# Patient Record
Sex: FEMALE | Race: WHITE | Employment: FULL TIME | URBAN - METROPOLITAN AREA
[De-identification: names, ages, dates, MRNs, and addresses within clinical notes are randomized per-mention and may not be internally consistent; named-entity substitution may affect disease eponyms.]

---

## 2020-01-13 ENCOUNTER — HOSPITAL ENCOUNTER (OUTPATIENT)
Dept: PREADMISSION TESTING | Age: 65
Discharge: HOME | End: 2020-01-17
Payer: COMMERCIAL

## 2020-01-13 ENCOUNTER — HOSPITAL ENCOUNTER (OUTPATIENT)
Dept: GENERAL RADIOLOGY | Age: 65
Discharge: HOME | End: 2020-01-15
Payer: COMMERCIAL

## 2020-01-13 VITALS
HEIGHT: 69 IN | OXYGEN SATURATION: 98 % | WEIGHT: 191.4 LBS | RESPIRATION RATE: 18 BRPM | DIASTOLIC BLOOD PRESSURE: 79 MMHG | HEART RATE: 78 BPM | SYSTOLIC BLOOD PRESSURE: 130 MMHG | BODY MASS INDEX: 28.35 KG/M2

## 2020-01-13 LAB
ANION GAP SERPL CALCULATED.3IONS-SCNC: 9 MMOL/L (ref 9–17)
BUN BLDV-MCNC: 14 MG/DL (ref 8–23)
BUN/CREAT BLD: 18 (ref 9–20)
CALCIUM SERPL-MCNC: 9.4 MG/DL (ref 8.6–10.4)
CHLORIDE BLD-SCNC: 103 MMOL/L (ref 98–107)
CO2: 27 MMOL/L (ref 20–31)
CREAT SERPL-MCNC: 0.8 MG/DL (ref 0.5–0.9)
GFR AFRICAN AMERICAN: >60 ML/MIN
GFR NON-AFRICAN AMERICAN: >60 ML/MIN
GFR SERPL CREATININE-BSD FRML MDRD: NORMAL ML/MIN/{1.73_M2}
GFR SERPL CREATININE-BSD FRML MDRD: NORMAL ML/MIN/{1.73_M2}
GLUCOSE BLD-MCNC: 72 MG/DL (ref 70–99)
HCT VFR BLD CALC: 41.9 % (ref 36.3–47.1)
HEMOGLOBIN: 13.2 G/DL (ref 11.9–15.1)
MCH RBC QN AUTO: 29.6 PG (ref 25.2–33.5)
MCHC RBC AUTO-ENTMCNC: 31.5 G/DL (ref 28.4–34.8)
MCV RBC AUTO: 93.9 FL (ref 82.6–102.9)
NRBC AUTOMATED: 0 PER 100 WBC
PDW BLD-RTO: 13.1 % (ref 11.8–14.4)
PLATELET # BLD: 263 K/UL (ref 138–453)
PMV BLD AUTO: 9.8 FL (ref 8.1–13.5)
POTASSIUM SERPL-SCNC: 4 MMOL/L (ref 3.7–5.3)
RBC # BLD: 4.46 M/UL (ref 3.95–5.11)
SODIUM BLD-SCNC: 139 MMOL/L (ref 135–144)
WBC # BLD: 8.5 K/UL (ref 3.5–11.3)

## 2020-01-13 PROCEDURE — 36415 COLL VENOUS BLD VENIPUNCTURE: CPT

## 2020-01-13 PROCEDURE — 80048 BASIC METABOLIC PNL TOTAL CA: CPT

## 2020-01-13 PROCEDURE — 93005 ELECTROCARDIOGRAM TRACING: CPT | Performed by: ANESTHESIOLOGY

## 2020-01-13 PROCEDURE — 71046 X-RAY EXAM CHEST 2 VIEWS: CPT

## 2020-01-13 PROCEDURE — 85027 COMPLETE CBC AUTOMATED: CPT

## 2020-01-13 RX ORDER — IBUPROFEN 400 MG/1
200 TABLET ORAL EVERY 6 HOURS PRN
COMMUNITY

## 2020-01-13 SDOH — HEALTH STABILITY: MENTAL HEALTH: HOW OFTEN DO YOU HAVE A DRINK CONTAINING ALCOHOL?: NEVER

## 2020-01-13 NOTE — H&P
Capacity:   1) Pt is able to walk 2 city blocks or more on level ground without SOB. 2) Pt is not able to climb 2 flights of stairs without SOB. 3) Pt is not able to walk up a hill for 1-2 city blocks without SOB. Family History:     Family History   Problem Relation Age of Onset    Cancer Father         Skin cancer    Diabetes Other     Breast Cancer Maternal Aunt     Diabetes Paternal Cousin     Heart Disease Neg Hx        Review of Systems:     Positive and Negative as described in HPI. CONSTITUTIONAL: Negative for fevers, chills, sweats, fatigue, and weight loss. HEENT: Pt wears reading glasses. Clear rhinorrhea. Negative for hearing changes and throat pain. RESPIRATORY: SOB with exertion. Negative for current shortness of breath, cough, congestion, and wheezing. Pt denies history of COPD and asthma. CARDIOVASCULAR: Negative for chest pain, blood clot, irregular heartbeat, and palpitations. GASTROINTESTINAL: Rare acid reflux. Negative for nausea, vomiting, diarrhea, constipation, change in bowel habits, and abdominal pain. GENITOURINARY: Negative for difficulty of urination, burning with urination, and frequency. INTEGUMENT: Negative for rash, skin lesions, and easy bruising. HEMATOLOGIC/LYMPHATIC: Negative for swelling/edema. ALLERGIC/IMMUNOLOGIC: Negative for urticaria and itching. ENDOCRINE: Heat intolerance. Pt gets blotches on her face when \"overheated\". Negative for increase in thirst, increase in urination, and cold intolerance. MUSCULOSKELETAL: Negative joint pains, muscle aches, and swelling of joints. NEUROLOGICAL: Negative for headaches, dizziness, lightheadedness, numbness, and tingling extremities. BEHAVIOR/PSYCH: Negative for depression and anxiety. Physical Exam:   /79   Pulse 78   Resp 18   Ht 5' 9\" (1.753 m)   Wt 191 lb 6.4 oz (86.8 kg)   SpO2 98%   BMI 28.26 kg/m²   No LMP recorded. Patient is postmenopausal.  No obstetric history on file.   No results for input(s): POCGLU in the last 72 hours. General Appearance:  Alert, well appearing, and in no acute distress. Mental status:  Oriented to person, place, and time. Head:  Normocephalic and atraumatic. Eye:  No icterus, redness, pupils equal and reactive, extraocular eye movements intact, and conjunctiva clear. Ear:  Hearing grossly intact. Nose:  No drainage noted. Mouth:  Mucous membranes moist.  Neck:  Supple and no carotid bruits noted. Lungs: Diminished throughout. Poor air exchange. No wheezing, rales, or rhonchi. Normal effort. Cardiovascular: Early systolic beats 1/49 seconds. Normal rate, regular rhythm, no murmur, gallop, or rub. Abdomen: Soft, non-tender, non-distended, and active bowel sounds. Neurologic: Normal speech and cranial nerves II through XII grossly intact. Strength 5/5 bilaterally. Skin: No gross lesions, rashes, bruising, or bleeding on exposed skin area. Extremities: Posterior tibial pulses 2+ bilaterally. No pedal edema. No calf tenderness with palpation. Psych: Normal affect. Investigations:      Laboratory Testing:  No results found for this or any previous visit (from the past 24 hour(s)). No results for input(s): HGB, HCT, WBC, MCV, PLATELET, NA, K, CL, CO2, BUN, CREATININE, GLUCOSE, INR, PROTIME, APTT, AST, ALT, LABALBU, HCG in the last 720 hours. EK2020. See Epic. Chest X-ray: Pending. Diagnosis:      1. Left breast cancer    Plans:     1.  Left breast lumpectomy with left sentinel node biopsy       RODRIGUEZ Shepherd - CNP  2020  2:07 PM

## 2020-01-14 LAB
EKG ATRIAL RATE: 70 BPM
EKG P AXIS: 74 DEGREES
EKG P-R INTERVAL: 162 MS
EKG Q-T INTERVAL: 386 MS
EKG QRS DURATION: 84 MS
EKG QTC CALCULATION (BAZETT): 416 MS
EKG R AXIS: 39 DEGREES
EKG T AXIS: 44 DEGREES
EKG VENTRICULAR RATE: 70 BPM

## 2020-01-14 PROCEDURE — 93010 ELECTROCARDIOGRAM REPORT: CPT | Performed by: INTERNAL MEDICINE

## 2020-01-27 ENCOUNTER — ANESTHESIA EVENT (OUTPATIENT)
Dept: OPERATING ROOM | Age: 65
End: 2020-01-27
Payer: COMMERCIAL

## 2020-01-28 ENCOUNTER — HOSPITAL ENCOUNTER (OUTPATIENT)
Dept: NUCLEAR MEDICINE | Age: 65
Discharge: HOME | End: 2020-01-30
Payer: COMMERCIAL

## 2020-01-28 ENCOUNTER — HOSPITAL ENCOUNTER (OUTPATIENT)
Age: 65
Setting detail: OUTPATIENT SURGERY
Discharge: HOME | End: 2020-01-28
Attending: SURGERY | Admitting: SURGERY
Payer: COMMERCIAL

## 2020-01-28 ENCOUNTER — ANESTHESIA (OUTPATIENT)
Dept: OPERATING ROOM | Age: 65
End: 2020-01-28
Payer: COMMERCIAL

## 2020-01-28 VITALS — DIASTOLIC BLOOD PRESSURE: 55 MMHG | OXYGEN SATURATION: 100 % | SYSTOLIC BLOOD PRESSURE: 95 MMHG | TEMPERATURE: 95.9 F

## 2020-01-28 VITALS
HEART RATE: 66 BPM | OXYGEN SATURATION: 98 % | SYSTOLIC BLOOD PRESSURE: 119 MMHG | RESPIRATION RATE: 16 BRPM | DIASTOLIC BLOOD PRESSURE: 82 MMHG | TEMPERATURE: 97.2 F

## 2020-01-28 PROCEDURE — 7100000011 HC PHASE II RECOVERY - ADDTL 15 MIN: Performed by: SURGERY

## 2020-01-28 PROCEDURE — 2720000010 HC SURG SUPPLY STERILE: Performed by: SURGERY

## 2020-01-28 PROCEDURE — 2580000003 HC RX 258: Performed by: SURGERY

## 2020-01-28 PROCEDURE — 2709999900 HC NON-CHARGEABLE SUPPLY: Performed by: SURGERY

## 2020-01-28 PROCEDURE — 2500000003 HC RX 250 WO HCPCS: Performed by: NURSE ANESTHETIST, CERTIFIED REGISTERED

## 2020-01-28 PROCEDURE — 6370000000 HC RX 637 (ALT 250 FOR IP): Performed by: SURGERY

## 2020-01-28 PROCEDURE — 7100000010 HC PHASE II RECOVERY - FIRST 15 MIN: Performed by: SURGERY

## 2020-01-28 PROCEDURE — 78195 LYMPH SYSTEM IMAGING: CPT

## 2020-01-28 PROCEDURE — 6360000002 HC RX W HCPCS: Performed by: SURGERY

## 2020-01-28 PROCEDURE — 6360000002 HC RX W HCPCS: Performed by: NURSE ANESTHETIST, CERTIFIED REGISTERED

## 2020-01-28 PROCEDURE — A9520 TC99 TILMANOCEPT DIAG 0.5MCI: HCPCS | Performed by: SURGERY

## 2020-01-28 PROCEDURE — 2500000003 HC RX 250 WO HCPCS: Performed by: SURGERY

## 2020-01-28 PROCEDURE — 3700000000 HC ANESTHESIA ATTENDED CARE: Performed by: SURGERY

## 2020-01-28 PROCEDURE — 7100000000 HC PACU RECOVERY - FIRST 15 MIN: Performed by: SURGERY

## 2020-01-28 PROCEDURE — 6370000000 HC RX 637 (ALT 250 FOR IP): Performed by: ANESTHESIOLOGY

## 2020-01-28 PROCEDURE — 3700000001 HC ADD 15 MINUTES (ANESTHESIA): Performed by: SURGERY

## 2020-01-28 PROCEDURE — 88307 TISSUE EXAM BY PATHOLOGIST: CPT

## 2020-01-28 PROCEDURE — 3600000012 HC SURGERY LEVEL 2 ADDTL 15MIN: Performed by: SURGERY

## 2020-01-28 PROCEDURE — 3430000000 HC RX DIAGNOSTIC RADIOPHARMACEUTICAL: Performed by: SURGERY

## 2020-01-28 PROCEDURE — 7100000001 HC PACU RECOVERY - ADDTL 15 MIN: Performed by: SURGERY

## 2020-01-28 PROCEDURE — 88342 IMHCHEM/IMCYTCHM 1ST ANTB: CPT

## 2020-01-28 PROCEDURE — 3600000002 HC SURGERY LEVEL 2 BASE: Performed by: SURGERY

## 2020-01-28 PROCEDURE — 2580000003 HC RX 258: Performed by: ANESTHESIOLOGY

## 2020-01-28 RX ORDER — LIDOCAINE HYDROCHLORIDE 20 MG/ML
INJECTION, SOLUTION EPIDURAL; INFILTRATION; INTRACAUDAL; PERINEURAL PRN
Status: DISCONTINUED | OUTPATIENT
Start: 2020-01-28 | End: 2020-01-28 | Stop reason: SDUPTHER

## 2020-01-28 RX ORDER — FENTANYL CITRATE 50 UG/ML
50 INJECTION, SOLUTION INTRAMUSCULAR; INTRAVENOUS EVERY 5 MIN PRN
Status: DISCONTINUED | OUTPATIENT
Start: 2020-01-28 | End: 2020-01-28 | Stop reason: HOSPADM

## 2020-01-28 RX ORDER — ONDANSETRON 2 MG/ML
INJECTION INTRAMUSCULAR; INTRAVENOUS PRN
Status: DISCONTINUED | OUTPATIENT
Start: 2020-01-28 | End: 2020-01-28 | Stop reason: SDUPTHER

## 2020-01-28 RX ORDER — HYDROMORPHONE HCL 110MG/55ML
0.5 PATIENT CONTROLLED ANALGESIA SYRINGE INTRAVENOUS EVERY 5 MIN PRN
Status: DISCONTINUED | OUTPATIENT
Start: 2020-01-28 | End: 2020-01-28 | Stop reason: HOSPADM

## 2020-01-28 RX ORDER — PROPOFOL 10 MG/ML
INJECTION, EMULSION INTRAVENOUS PRN
Status: DISCONTINUED | OUTPATIENT
Start: 2020-01-28 | End: 2020-01-28 | Stop reason: SDUPTHER

## 2020-01-28 RX ORDER — FENTANYL CITRATE 50 UG/ML
INJECTION, SOLUTION INTRAMUSCULAR; INTRAVENOUS PRN
Status: DISCONTINUED | OUTPATIENT
Start: 2020-01-28 | End: 2020-01-28 | Stop reason: SDUPTHER

## 2020-01-28 RX ORDER — OXYCODONE HYDROCHLORIDE AND ACETAMINOPHEN 5; 325 MG/1; MG/1
1 TABLET ORAL EVERY 4 HOURS PRN
Qty: 15 TABLET | Refills: 0 | Status: SHIPPED | OUTPATIENT
Start: 2020-01-28 | End: 2020-02-04

## 2020-01-28 RX ORDER — DEXAMETHASONE SODIUM PHOSPHATE 10 MG/ML
INJECTION INTRAMUSCULAR; INTRAVENOUS PRN
Status: DISCONTINUED | OUTPATIENT
Start: 2020-01-28 | End: 2020-01-28 | Stop reason: SDUPTHER

## 2020-01-28 RX ORDER — HYDROMORPHONE HCL 110MG/55ML
0.25 PATIENT CONTROLLED ANALGESIA SYRINGE INTRAVENOUS EVERY 5 MIN PRN
Status: DISCONTINUED | OUTPATIENT
Start: 2020-01-28 | End: 2020-01-28 | Stop reason: HOSPADM

## 2020-01-28 RX ORDER — LIDOCAINE HYDROCHLORIDE 10 MG/ML
1 INJECTION, SOLUTION EPIDURAL; INFILTRATION; INTRACAUDAL; PERINEURAL
Status: DISCONTINUED | OUTPATIENT
Start: 2020-01-29 | End: 2020-01-28 | Stop reason: HOSPADM

## 2020-01-28 RX ORDER — SODIUM CHLORIDE, SODIUM LACTATE, POTASSIUM CHLORIDE, CALCIUM CHLORIDE 600; 310; 30; 20 MG/100ML; MG/100ML; MG/100ML; MG/100ML
INJECTION, SOLUTION INTRAVENOUS CONTINUOUS
Status: DISCONTINUED | OUTPATIENT
Start: 2020-01-29 | End: 2020-01-28 | Stop reason: HOSPADM

## 2020-01-28 RX ORDER — IBUPROFEN 800 MG/1
800 TABLET ORAL EVERY 8 HOURS PRN
Qty: 50 TABLET | Refills: 3 | Status: SHIPPED | OUTPATIENT
Start: 2020-01-28

## 2020-01-28 RX ORDER — SODIUM CHLORIDE 9 MG/ML
INJECTION INTRAVENOUS PRN
Status: DISCONTINUED | OUTPATIENT
Start: 2020-01-28 | End: 2020-01-28 | Stop reason: ALTCHOICE

## 2020-01-28 RX ORDER — BUPIVACAINE HYDROCHLORIDE 2.5 MG/ML
INJECTION, SOLUTION EPIDURAL; INFILTRATION; INTRACAUDAL PRN
Status: DISCONTINUED | OUTPATIENT
Start: 2020-01-28 | End: 2020-01-28 | Stop reason: ALTCHOICE

## 2020-01-28 RX ORDER — ONDANSETRON 2 MG/ML
4 INJECTION INTRAMUSCULAR; INTRAVENOUS
Status: DISCONTINUED | OUTPATIENT
Start: 2020-01-28 | End: 2020-01-28 | Stop reason: HOSPADM

## 2020-01-28 RX ORDER — SCOLOPAMINE TRANSDERMAL SYSTEM 1 MG/1
1 PATCH, EXTENDED RELEASE TRANSDERMAL ONCE
Status: DISCONTINUED | OUTPATIENT
Start: 2020-01-28 | End: 2020-01-28 | Stop reason: HOSPADM

## 2020-01-28 RX ORDER — MIDAZOLAM HYDROCHLORIDE 1 MG/ML
INJECTION INTRAMUSCULAR; INTRAVENOUS PRN
Status: DISCONTINUED | OUTPATIENT
Start: 2020-01-28 | End: 2020-01-28 | Stop reason: SDUPTHER

## 2020-01-28 RX ORDER — FENTANYL CITRATE 50 UG/ML
25 INJECTION, SOLUTION INTRAMUSCULAR; INTRAVENOUS EVERY 5 MIN PRN
Status: DISCONTINUED | OUTPATIENT
Start: 2020-01-28 | End: 2020-01-28 | Stop reason: HOSPADM

## 2020-01-28 RX ORDER — LIDOCAINE 40 MG/G
CREAM TOPICAL
Status: COMPLETED | OUTPATIENT
Start: 2020-01-28 | End: 2020-01-28

## 2020-01-28 RX ORDER — LIDOCAINE 40 MG/G
CREAM TOPICAL
Status: CANCELLED | OUTPATIENT
Start: 2020-01-28

## 2020-01-28 RX ADMIN — MIDAZOLAM 2 MG: 1 INJECTION INTRAMUSCULAR; INTRAVENOUS at 12:13

## 2020-01-28 RX ADMIN — TILMANOCEPT 0.6 MILLICURIE: KIT at 10:17

## 2020-01-28 RX ADMIN — SODIUM CHLORIDE, POTASSIUM CHLORIDE, SODIUM LACTATE AND CALCIUM CHLORIDE: 600; 310; 30; 20 INJECTION, SOLUTION INTRAVENOUS at 09:30

## 2020-01-28 RX ADMIN — PROPOFOL 25 MG: 10 INJECTION, EMULSION INTRAVENOUS at 13:16

## 2020-01-28 RX ADMIN — Medication 100 MCG: at 12:44

## 2020-01-28 RX ADMIN — PROPOFOL 150 MG: 10 INJECTION, EMULSION INTRAVENOUS at 12:21

## 2020-01-28 RX ADMIN — Medication 100 MCG: at 12:20

## 2020-01-28 RX ADMIN — DEXAMETHASONE SODIUM PHOSPHATE 10 MG: 10 INJECTION INTRAMUSCULAR; INTRAVENOUS at 12:25

## 2020-01-28 RX ADMIN — LIDOCAINE 4%: 4 CREAM TOPICAL at 09:32

## 2020-01-28 RX ADMIN — LIDOCAINE HYDROCHLORIDE 100 MG: 20 INJECTION, SOLUTION EPIDURAL; INFILTRATION; INTRACAUDAL; PERINEURAL at 12:21

## 2020-01-28 RX ADMIN — PROPOFOL 25 MG: 10 INJECTION, EMULSION INTRAVENOUS at 13:50

## 2020-01-28 RX ADMIN — DEXTROSE MONOHYDRATE 2 G: 50 INJECTION, SOLUTION INTRAVENOUS at 12:28

## 2020-01-28 RX ADMIN — ONDANSETRON 4 MG: 2 INJECTION, SOLUTION INTRAMUSCULAR; INTRAVENOUS at 12:25

## 2020-01-28 ASSESSMENT — PULMONARY FUNCTION TESTS
PIF_VALUE: 12
PIF_VALUE: 15
PIF_VALUE: 12
PIF_VALUE: 15
PIF_VALUE: 12
PIF_VALUE: 15
PIF_VALUE: 15
PIF_VALUE: 12
PIF_VALUE: 15
PIF_VALUE: 2
PIF_VALUE: 12
PIF_VALUE: 15
PIF_VALUE: 15
PIF_VALUE: 6
PIF_VALUE: 12
PIF_VALUE: 15
PIF_VALUE: 12
PIF_VALUE: 15
PIF_VALUE: 12
PIF_VALUE: 14
PIF_VALUE: 12
PIF_VALUE: 6
PIF_VALUE: 13
PIF_VALUE: 12
PIF_VALUE: 6
PIF_VALUE: 1
PIF_VALUE: 14
PIF_VALUE: 12
PIF_VALUE: 12
PIF_VALUE: 15
PIF_VALUE: 12
PIF_VALUE: 14
PIF_VALUE: 0
PIF_VALUE: 16
PIF_VALUE: 15
PIF_VALUE: 12
PIF_VALUE: 12
PIF_VALUE: 2
PIF_VALUE: 12
PIF_VALUE: 6
PIF_VALUE: 12
PIF_VALUE: 14
PIF_VALUE: 15
PIF_VALUE: 12
PIF_VALUE: 12
PIF_VALUE: 2
PIF_VALUE: 12
PIF_VALUE: 15
PIF_VALUE: 1
PIF_VALUE: 1
PIF_VALUE: 12
PIF_VALUE: 21
PIF_VALUE: 12
PIF_VALUE: 12
PIF_VALUE: 14
PIF_VALUE: 12
PIF_VALUE: 14
PIF_VALUE: 15
PIF_VALUE: 11
PIF_VALUE: 12
PIF_VALUE: 15
PIF_VALUE: 12
PIF_VALUE: 16
PIF_VALUE: 12
PIF_VALUE: 15
PIF_VALUE: 12
PIF_VALUE: 15
PIF_VALUE: 12
PIF_VALUE: 1
PIF_VALUE: 12
PIF_VALUE: 0
PIF_VALUE: 12
PIF_VALUE: 6
PIF_VALUE: 12
PIF_VALUE: 12
PIF_VALUE: 15
PIF_VALUE: 14
PIF_VALUE: 12
PIF_VALUE: 5
PIF_VALUE: 15
PIF_VALUE: 12
PIF_VALUE: 0
PIF_VALUE: 14
PIF_VALUE: 12
PIF_VALUE: 14
PIF_VALUE: 15
PIF_VALUE: 12
PIF_VALUE: 15

## 2020-01-28 ASSESSMENT — PAIN DESCRIPTION - PAIN TYPE: TYPE: SURGICAL PAIN

## 2020-01-28 ASSESSMENT — PAIN DESCRIPTION - ORIENTATION: ORIENTATION: LEFT

## 2020-01-28 ASSESSMENT — PAIN SCALES - GENERAL
PAINLEVEL_OUTOF10: 0
PAINLEVEL_OUTOF10: 0
PAINLEVEL_OUTOF10: 2

## 2020-01-28 ASSESSMENT — PAIN DESCRIPTION - LOCATION: LOCATION: BREAST

## 2020-01-28 ASSESSMENT — PAIN DESCRIPTION - DESCRIPTORS: DESCRIPTORS: SORE

## 2020-01-28 NOTE — H&P
Historical Provider, MD            Allergies:      Patient has no known allergies. Social History:      Tobacco:    reports that she quit smoking about 5 years ago. She has never used smokeless tobacco.  Alcohol:      reports previous alcohol use. Drug Use:  reports current drug use. Functional Capacity:              1) Pt is able to walk 2 city blocks or more on level ground without SOB. 2) Pt is not able to climb 2 flights of stairs without SOB. 3) Pt is not able to walk up a hill for 1-2 city blocks without SOB. Family History:      Family History         Family History   Problem Relation Age of Onset    Cancer Father           Skin cancer    Diabetes Other      Breast Cancer Maternal Aunt      Diabetes Paternal Cousin      Heart Disease Neg Hx              Review of Systems:      Positive and Negative as described in HPI. CONSTITUTIONAL: Negative for fevers, chills, sweats, fatigue, and weight loss. HEENT: Pt wears reading glasses. Clear rhinorrhea. Negative for hearing changes and throat pain. RESPIRATORY: SOB with exertion. Negative for current shortness of breath, cough, congestion, and wheezing. Pt denies history of COPD and asthma. CARDIOVASCULAR: Negative for chest pain, blood clot, irregular heartbeat, and palpitations. GASTROINTESTINAL: Rare acid reflux. Negative for nausea, vomiting, diarrhea, constipation, change in bowel habits, and abdominal pain. GENITOURINARY: Negative for difficulty of urination, burning with urination, and frequency. INTEGUMENT: Negative for rash, skin lesions, and easy bruising. HEMATOLOGIC/LYMPHATIC: Negative for swelling/edema. ALLERGIC/IMMUNOLOGIC: Negative for urticaria and itching. ENDOCRINE: Heat intolerance. Pt gets blotches on her face when \"overheated\". Negative for increase in thirst, increase in urination, and cold intolerance.   MUSCULOSKELETAL: Negative joint pains, muscle aches, and swelling of joints. NEUROLOGICAL: Negative for headaches, dizziness, lightheadedness, numbness, and tingling extremities. BEHAVIOR/PSYCH: Negative for depression and anxiety. Physical Exam:   /79   Pulse 78   Resp 18   Ht 5' 9\" (1.753 m)   Wt 191 lb 6.4 oz (86.8 kg)   SpO2 98%   BMI 28.26 kg/m²   No LMP recorded. Patient is postmenopausal.  No obstetric history on file. No results for input(s): POCGLU in the last 72 hours. General Appearance:  Alert, well appearing, and in no acute distress. Mental status:  Oriented to person, place, and time. Head:  Normocephalic and atraumatic. Eye:  No icterus, redness, pupils equal and reactive, extraocular eye movements intact, and conjunctiva clear. Ear:  Hearing grossly intact. Nose:  No drainage noted. Mouth:  Mucous membranes moist.  Neck:  Supple and no carotid bruits noted. Lungs: Diminished throughout. Poor air exchange. No wheezing, rales, or rhonchi. Normal effort. Cardiovascular: Early systolic beats 5/44 seconds. Normal rate, regular rhythm, no murmur, gallop, or rub. Abdomen: Soft, non-tender, non-distended, and active bowel sounds. Neurologic: Normal speech and cranial nerves II through XII grossly intact. Strength 5/5 bilaterally. Skin: No gross lesions, rashes, bruising, or bleeding on exposed skin area. Extremities: Posterior tibial pulses 2+ bilaterally. No pedal edema. No calf tenderness with palpation. Psych: Normal affect. Investigations:       Laboratory Testing:  Recent Results   No results found for this or any previous visit (from the past 24 hour(s)). No results for input(s): HGB, HCT, WBC, MCV, PLATELET, NA, K, CL, CO2, BUN, CREATININE, GLUCOSE, INR, PROTIME, APTT, AST, ALT, LABALBU, HCG in the last 720 hours. EK2020. See Epic. Chest X-ray: Pending. Diagnosis:       1. Left breast cancer     Plans:      1.  Left breast lumpectomy with left sentinel node biopsy         RODRIGUEZ Mejia - CNP  1/13/2020  2:07 PM               Cosigned by: Rhett Welch MD at 1/14/2020  1:17 PM   Revision History                       Routing History

## 2020-01-28 NOTE — OP NOTE
Jessica Solizens  [id number]  Preoperative diagnosis: Left breast cancer    Postoperative diagnosis: Left breast cancer    Procedure: Left breast lumpectomy. Left sentinel node biopsies x2    Anesthesia: General    Surgeon: Dr. Rosette Lance    Indications: This 80-year-old patient is admitted today for treatment of a recently diagnosed left breast cancer which was invasive ductal type estrogen and progesterone receptor positive. After discussing options for treatment, the patient wished to proceed with left breast conserving surgery. The patient did realize that the malignancy may involve the skin and the muscle and would involve full-thickness removal of the breast tissue. The patient is admitted today for this procedure. Procedure: The patient was previously injected with technetium sulfur colloid in preparation for the sentinel node biopsies. She was brought to the surgical suite and given a general anesthesia. Ancef 2 g was given intravenously prior to the incision. Pneumatic cuffs were placed on both legs and continued throughout the case. A warming blanket was placed over the lower torso. The entire left breast, left axilla, shoulder neck and abdomen were prepped with Hibiclens soap and draped in a sterile fashion. The cancer was located in the upper inner quadrant about 4 cm from the nipple areolar complex. Due to the fact that the lesion was going to come very close to the skin, I did make an incision transversely overlying the mass. This was carried through the skin into the subcutaneous tissues with the peak plasma blade creating an upper and lower skin flap. The mass associated with some normal surrounding breast tissue was excised from the subcutaneous tissues down to the pectoralis major fascia. This was then carefully oriented for pathology and sent to pathology for frozen section for margins.   The pathologist called back stating that the anterior superior, superior margin and superior posterior margin were all focally positive. The other margins were at least 5 mm. I did remove additional tissue from the anterior superior margin including skin, the superior margin, and the posterior margin which involved pectoralis muscle. Hemostasis was obtained. The cavity was irrigated with sterile water until the effluent returned clear and packed with water soaked gauze. I redirected my attention to the left axilla where a transverse incision was made with the peak plasma blade in the lower axillary crease. This was carried through the skin and the subcutaneous tissues. I used the harmonic focus to dissected through the subcutaneous tissues and axillary fascia. A probe was brought into the wound and identified the first hotspot in the mid central portion. There was a small lymph node dissected free which had counts of 10,000. There was a second lymph node adjacent to this which had counts of 2500. These 2 lymph nodes were dissected and placed in a sterile container. I rescanned the entire axilla and did not find any elevated counts over 250. Since 10% of the hottest lymph node was greater than 1000, no further axillary lymph nodes were removed. Hemostasis was obtained. 1/4% plain Marcaine was infiltrated along each incision and the deeper aspects of each wound for a total of 70 cc. Each cavity was irrigated with sterile water until the effluent returned clear. The left breast incision was closed by removing any dogears medially and laterally. The dermis was reapproximated with sutures of 3-0 Monocryl followed by the skin with a running subcuticular stitch of 4-0 Monocryl. The axillary incision was closed similarly with interrupted sutures of 3-0 Monocryl in the dermis and a running subcuticular stitch of 4-0 Monocryl in the skin. Dermabond was applied followed by sterile dressing and an Ace wrap. At this point the patient was returned to the recovery room in good condition.   Sponge and needle counts are correct. Estimated blood loss is less than 50 cc.

## 2020-01-28 NOTE — ANESTHESIA PRE PROCEDURE
Department of Anesthesiology  Preprocedure Note       Name:  Destiny Bowman   Age:  60 y.o.  :  1955                                          MRN:  [id number]         Date:  2020      Surgeon: Chava Malone):  Jermaine Holloway MD    Procedure: LEFT BREAST LUMPECTOMY  WITH LEFT SENTINEL NODE BX  @ 10AM (Left )    Medications prior to admission:   Prior to Admission medications    Medication Sig Start Date End Date Taking? Authorizing Provider   ibuprofen (ADVIL;MOTRIN) 400 MG tablet Take 400 mg by mouth every 6 hours as needed for Pain    Historical Provider, MD   Probiotic Product (CULTURELLE PROBIOTICS PO) Take by mouth    Historical Provider, MD       Current medications:    Current Facility-Administered Medications   Medication Dose Route Frequency Provider Last Rate Last Dose    [START ON 2020] lactated ringers infusion   Intravenous Continuous Jose Kessler MD       Ofelia Bio [START ON 2020] lidocaine PF 1 % injection 1 mL  1 mL Intradermal Once PRN Jose Kessler MD        lidocaine (LMX) 4 % cream   Topical As Directed RT PRN Jermaine Holloway MD           Allergies:  No Known Allergies    Problem List:  There is no problem list on file for this patient. Past Medical History:        Diagnosis Date    Cancer ([address]     breast    Heat intolerance     Pt gets blotches on her face when overheated.     PONV (postoperative nausea and vomiting)        Past Surgical History:        Procedure Laterality Date    CYST REMOVAL      1978       Social History:    Social History     Tobacco Use    Smoking status: Former Smoker     Last attempt to quit:      Years since quittin.0    Smokeless tobacco: Never Used   Substance Use Topics    Alcohol use: Not Currently     Frequency: Never                                Counseling given: Not Answered      Vital Signs (Current):   Vitals:    20 0918   BP: 120/70   Pulse: 79   Resp: 16   Temp: 97.9 °F (36.6 °C)   SpO2: 98% BP Readings from Last 3 Encounters:   01/28/20 120/70   01/13/20 130/79       NPO Status:                                                                                 BMI:   Wt Readings from Last 3 Encounters:   01/13/20 191 lb 6.4 oz (86.8 kg)     There is no height or weight on file to calculate BMI.    CBC:   Lab Results   Component Value Date    WBC 8.5 01/13/2020    RBC 4.46 01/13/2020    HGB 13.2 01/13/2020    HCT 41.9 01/13/2020    MCV 93.9 01/13/2020    RDW 13.1 01/13/2020     01/13/2020       CMP:   Lab Results   Component Value Date     01/13/2020    K 4.0 01/13/2020     01/13/2020    CO2 27 01/13/2020    BUN 14 01/13/2020    CREATININE 0.80 01/13/2020    GFRAA >60 01/13/2020    LABGLOM >60 01/13/2020    GLUCOSE 72 01/13/2020    CALCIUM 9.4 01/13/2020       POC Tests: No results for input(s): POCGLU, POCNA, POCK, POCCL, POCBUN, POCHEMO, POCHCT in the last 72 hours. Coags: No results found for: PROTIME, INR, APTT    HCG (If Applicable): No results found for: PREGTESTUR, PREGSERUM, HCG, HCGQUANT     ABGs: No results found for: PHART, PO2ART, HQA3YFC, QXA9XWJ, BEART, H2QVJDOM     Type & Screen (If Applicable):  No results found for: LABABO, LABRH    Anesthesia Evaluation     history of anesthetic complications: PONV. Airway: Mallampati: I  TM distance: >3 FB   Neck ROM: full  Mouth opening: > = 3 FB Dental:          Pulmonary: breath sounds clear to auscultation                             Cardiovascular:  Exercise tolerance: good (>4 METS),       (-)  angina and  MCCLOUD      Rhythm: regular  Rate: normal                    Neuro/Psych:               GI/Hepatic/Renal:             Endo/Other:    (+) malignancy/cancer. Abdominal:           Vascular:                                        Anesthesia Plan      general     ASA 2       Induction: intravenous. Anesthetic plan and risks discussed with patient.                       Sebas Basilio MD

## 2020-01-28 NOTE — ANESTHESIA POSTPROCEDURE EVALUATION
Department of Anesthesiology  Postprocedure Note    Patient: Elas Chang  MRN: [id number]  YOB: 1955  Date of evaluation: 1/28/2020  Time:  3:37 PM     Procedure Summary     Date:  01/28/20 Room / Location:  [place] [address] / [facility] - INPATIENT    Anesthesia Start:  8095 Anesthesia Stop:  0996    Procedure:  LEFT BREAST LUMPECTOMY  WITH LEFT SENTINEL NODE BX  @ 10AM (Left ) Diagnosis:  (DX LEFT BREAST CA)    Surgeon:  Tyrell Baker MD Responsible Provider:  Irma Marinelli MD    Anesthesia Type:  general ASA Status:  2          Anesthesia Type: general    Isiah Phase I: Isiah Score: 10    Isiah Phase II:      Last vitals: Reviewed and per EMR flowsheets.        Anesthesia Post Evaluation    Patient location during evaluation: PACU  Level of consciousness: awake and alert  Complications: no

## 2020-01-31 LAB — SURGICAL PATHOLOGY REPORT: NORMAL

## 2020-02-03 ENCOUNTER — TELEPHONE (OUTPATIENT)
Dept: ONCOLOGY | Age: 65
End: 2020-02-03

## 2020-02-10 ENCOUNTER — TELEPHONE (OUTPATIENT)
Dept: RADIATION ONCOLOGY | Facility: MEDICAL CENTER | Age: 65
End: 2020-02-10

## 2020-02-11 ENCOUNTER — TELEPHONE (OUTPATIENT)
Dept: RADIATION ONCOLOGY | Facility: MEDICAL CENTER | Age: 65
End: 2020-02-11

## 2020-02-27 ENCOUNTER — TELEPHONE (OUTPATIENT)
Dept: ONCOLOGY | Age: 65
End: 2020-02-27

## 2020-02-27 ENCOUNTER — HOSPITAL ENCOUNTER (OUTPATIENT)
Dept: RADIATION ONCOLOGY | Facility: MEDICAL CENTER | Age: 65
Discharge: HOME | End: 2020-02-27
Payer: COMMERCIAL

## 2020-02-27 VITALS
DIASTOLIC BLOOD PRESSURE: 79 MMHG | BODY MASS INDEX: 28.66 KG/M2 | HEART RATE: 73 BPM | RESPIRATION RATE: 16 BRPM | SYSTOLIC BLOOD PRESSURE: 122 MMHG | HEIGHT: 69 IN | WEIGHT: 193.5 LBS | OXYGEN SATURATION: 98 % | TEMPERATURE: 97.9 F

## 2020-02-27 PROCEDURE — 99205 OFFICE O/P NEW HI 60 MIN: CPT | Performed by: RADIOLOGY

## 2020-02-27 PROCEDURE — 99213 OFFICE O/P EST LOW 20 MIN: CPT | Performed by: RADIOLOGY

## 2020-02-27 NOTE — PROGRESS NOTES
attended at all times by family member or staff  3. Provide assistance as indicated for ambulation activities  4. Reorient confused/cognitively impaired patient  5. Call-light/bell within patient's reach  6. Chair/bed in low position, stretcher/bed with siderails up except when performing patient care activities  7. Educate patient/family/caregiver on falls prevention             Assessment/Plan: Patient was seen today for consultation.          Funmi Fajardo 2/27/2020 2:10 PM

## 2020-02-27 NOTE — PROGRESS NOTES
normal dentition, uvula and soft palate elevate symmetrically on phonation. No suspicious asymmetry or mucosal lesions appreciated. NECK: No thyromegaly, cervical or supraclavicular lymphadenopathy. LUNGS: Clear to auscultation bilaterally, with good aeration. HEART: Normal rate, regular rhythm, normal S1/S2, no murmurs/rubs/gallops. BREAST: Status post left lumpectomy iin the upper inner quadrant, resolving ecchymosis, mild kelly-incisional induration, as expected, no tenderness to palpation. No palpable adenopathy in either axillae or supraclavicular fossae. No palpable mass in contralateral breast.  ABD: Normoactive bowel sounds, soft, nontender, no visceromegaly or masses appreciated. LYMPH: No lymphadenopathy appreciated. EXT: Full range of motion in all extremities. No cyanosis/clubbing or edema. NEURO: Alert and fully oriented, answers questions appropriately, speech is fluent without dysarthria, cranial nerves II through XII grossly normal, muscle power in all extremities 5/5. PSYCH: Affect is appropriate for clinical situation. Insight and judgment do not appear impaired. LABS: 1/13/2020 CBC within normal limits. Same-day basic metabolic panel also within normal limits. RADS: 11/25/2019 bilateral digital diagnostic mammogram and left ultrasound showed a 2.7 cm irregular high density mass with a spiculated margin in the left breast, 10 o'clock position, middle depth, correlates to the palpable lesion. Ultrasound demonstrated an irregular solid mass correlating to mammography findings. BI-RADS Category 5, highly suggestive of malignancy. PATHOLOGY: 1/28/2020 left breast lumpectomy specimen showed poorly differentiated invasive ductal carcinoma, 3.2 cm in greatest dimension, grade 3. Initial margin assessment revealed invasive carcinoma 0.1 mm from closest superior anterior margin, and 2 mm from central deep posterior margin.   Additional excision resulted in greater than 5 mm margins Ethan Hernández MD as Consulting Physician (General Surgery)     N.B. This note is created with the assistance of a speech recognition program.  While intending to generate a document that actually reflects the content of the visit, the document can still contain errors including those of syntax and sound-alike substitutions that may escape proof reading. It such instances, actual meaning can be extrapolated by contextual diversion.

## 2020-02-27 NOTE — LETTER
[facility]   Medhat Bender  [address]     [address] [place] [address] [address] [address]      Dear Kelly David ,    I am writing you on behalf of the [facility].  Thank you for choosing [facility] for your radiation oncology treatment. We look forward to working with you. In light of your recent cancer diagnosis, we wanted to connect with you and let you know you are not alone in this journey. While this news was likely unexpected, we want to offer our support and resources to educate yourself on cancer care and the contact information of the nurse navigation team who can assist you at any time. Information about your diagnosis and treatment options is very important for you and your family moving forward. Please rely on your physician(s) for guidance and direction about your care. Be sure to ask questions and seek out as much information as you need. We are here for you at any point to answer questions or provide support. I have enclosed some very general information about cancer care at [facility]) in the [facility] area. In addition, a contact telephone number for the oncology nurse navigation team is included if you have questions about your diagnosis, need assistance with scheduling appointments with other oncology specialists or you would like to be connected to other resources within [facility]) and the community. At [facility]), we believe in a collaborative approach to treating cancer. Please do not hesitate to contact the nurse navigation team.  We will keep in touch. Kindest regards,         Sofia RUIZ,RN,ONN-CG  [address] [address]  Oncology Nurse Navigator  P: [phone]  Fax: [phone]  Email: [email or web address]

## 2020-02-27 NOTE — TELEPHONE ENCOUNTER
Called patient to introduce myself as her oncology nurse navigator at [facility] ([facility]). I left message explaining that I am here as a support for her and a resource. Let her know that I would mail some information to her about resources. I see that she has an appointment today at one of our facilities. Encouraged her to call me if there is anything that I can do to assist her. I let her know that I work with the doctor and staff at [facility]. Navigation packet mailed  Patient see's Dr. Sae Ken today. Pt on pending.

## 2020-03-03 ENCOUNTER — HOSPITAL ENCOUNTER (OUTPATIENT)
Dept: RADIATION ONCOLOGY | Facility: MEDICAL CENTER | Age: 65
Discharge: HOME | End: 2020-03-03
Payer: COMMERCIAL

## 2020-03-03 PROCEDURE — 77334 RADIATION TREATMENT AID(S): CPT

## 2020-03-03 PROCEDURE — 77290 THER RAD SIMULAJ FIELD CPLX: CPT

## 2020-03-03 NOTE — PROGRESS NOTES
Pt here today for teach and simulation scan. Radiation and You information provided along with additional education regarding radiation therapy and what to expect. Pt verbalizes understanding and all questions answered to the best of my knowledge. Samples of aquaphor and community resource information provided. Pt escorted to CT room without any difficulty. Radiation Therapy Education    · A Simulation Scan is like having a CT scan. Your physician will use the images obtained to develop your radiation treatment. · May take 30 minutes to 1 hour to complete  · Marking will be applied to your skin to help insure accurate positioning for your future treatments. · A mold or a mask may also be needed to help aid in your positioning    · Schedule: You will have treatments Monday - Friday  · Treatments should take about 15 minutes from the time you enter the treatment room  · You will have the same appointment time each day  · You will see your doctor and nurse one day weekly while you are undergoing treatments. · Every effort will be make to start your treatment at the scheduled time. However, there may be occasional delays due to emergency patients, technical problems, or other difficulties. · Side Effects: Radiation is a local treatment so any side effects you may experience will be in your treatment area only. · If you experience side effects they will typically occur after the 2nd or 3rd week of treatments. · Many patients do not experience severe side effects and are able to continue working during their treatments.  If you feel you treatments are interfering with your job, please notify your nurse        Skin Care During Radiation Treatments          Start applying moisturizers to the skin two times a day when you start your radiation        treatments  · Suggested moisturizers include: Aquaphor, Eucerin, Exclair, Borion gel (may be purchased at [facility]) or [employer] (to order call [phone] or go to www.[employer])  · Do not apply anything to your skin in your treatment area that is not recommended by your radiation nurse and/or doctor  Good general hygiene/bathing should be performed daily  · Use moisturizing soaps that do not contain perfume or fragrances. Recommended soaps include Dove or Dial  · Use warm water, not hot water when bathing  · After bathing, pat the skin dry rather than rubbing it, especially at the treatment site  · Do not shave the treatment area. If you must shave, such as a beard, use an electric shaver. Don't use pre/after shave creams on treatment area. Avoid friction on and around your treatment area  · Do not use tape, bandages, or medicated patches in the treatment area  · Avoid tight fitting clothing  · No massaging or scratching    Avoid extremes of temperature on the treatment area such as heating pads, ice packs, hot tubs, or saunas    If the area being treated is exposed to the sun, apply sunscreen routinely to the treatment site whenever you are outdoors. A sunscreen with a minimum of SFF30 should be used. Since the area being treated will always be more sensitive than the rest of your skin, continue to protect the area from sun exposure after your treatment ends    If your armpit is in the treatment area, do not use antiperspirants. An aluminum-free, non-metallic deodorant may be used. Tell your nurse or doctor if you have any of the following symptoms:  · Blistered, open, swollen or tender areas of the skin in and around the treatment area  · Pain or itching that is not helped by prescribed medications or recommended ointments      Questions and Answers about Radiation Therapy  1. What is radiation therapy? Radiation therapy (also called radiotherapy) is a cancer treatment that uses high  doses of radiation to kill cancer cells and shrink tumors.  At low doses, radiation  is used as an x-ray to see inside your body and take pictures, such as x-rays of  your teeth or broken bones. 2.  How is radiation therapy given? Radiation therapy can be external beam or internal. External beam involves a  machine outside your body that aims radiaition of cancer cells. Internal radiation  therapy involves placing radiation inside your body, in or near the cancer. Sometimes ppeople get both forms of radiation therapy. To learn more about  external beam radiation therapy. 3.  What does radiation therapy do to cancer cells? Given in high doses, radiation kills or slows the growth of cancer cells. Radiaton  therapy is used to:  · Treat Cancer. Radiation can be used to cure cancer, to prevent it from returning, or to stop or slow its growth  · Reduce symptoms. When a cure is not possible, radiation may be used to treat pain and other problems caused by the cancer tumor. Or, it can prevent problems that may be caused by a growing tumor, such as blindness or loss of bowel and bladder control. 4.  How long does radiation therapy take to work? Radiation therapy does not kill cancer cells right away. It takes days or weeks of  treatments before cancer cells start to die. Then, cancer cells keep dying for  weeks or months after radiation therapy ends. 5.  What does radiation therapy do to healthy cells? Radiation not only dills or slows the growth of cancer cells, it can also affect  nearby healthy cells. The healthy cells almost always recover after treatment is  over. But sometimes people may have side effects that are severe or do not get  better. Other side effects may how up months or years after radiation therapy is  over. These are called late side effects. Doctors try to protect healthy cells during treatment by:  · Using as low a does of radiation as possible. The radiation dose is balanced between being high enough to kill cancer cells, yet low enough to limit damage to healthy cells. · Spreading out treatment over time.  You may get radiation therapy once a day, or in smaller doses twice a day for several weeks. Spreading out the radiation dose allows normal cells to recover while cancer cells die. · Aimimng radiation at a precise part of your body. Some types of radiation therapy allow your doctor to aim high doses of radiaiton at your cancer while reducing radiation to nearby healthy tissue. These techniques use a computer to deliver precise radiation doses to a cancer tumor or to specific areas within the tumor. [address] [facility]             Breast Side Effects  · Fatigue  · Hair loss  · Skin changes  · Tenderness  · Swelling     Fatigue  How long it lasts  When you will first feel fatigue depends on a few factors, such as your age, health, how active you are, and how you felt before radiation therapy started. Fatigue can last from 6 weeks to a year after your last radiation therapy session. Some people may always feel fatigue and not have as much energy as they did before radiation therapy. Ways to Manage Fatigue    · Try to sleep at least 8 hours each night. This may be more sleep than you needed before radiation therapy. One way to sleep better at night is to be active during the day. Another way is to relax right before going to bed. Do calming activities before bedtime, such as reading, working on a [employer] puzzle, or listening to music. · Plan time to rest. Take short naps or rest breaks between activities. · Try not to do too much. With fatigue, you ay not have enough energy to do all the things you want to do. Stay active, but choose the activities that are most important to you. Try to let go of things that don't matter as much now. For example, you might go to work but not do housework. You might watch your children's sprots events but not cook dinner. · Exercise. Research shows that most people feel better when they get some exercise each day. Go for a short walk, ride a bike, or do yoga.  Talk with your doctor or nurse about types of exercise you can do while having radiation therapy. · Relax. Mediatation, prayer, gentle yoga, guided imagery, and visualization are ways you can learn to relax and decrease stress. For relaxation exercises:  · Visit Learning to Relax on the [employer] [place]'s website at : www.cancer.gov/about-cancer/copingfeelings/relaxation  · See Facing Forward: Life After Cancer Treatment at: www.cancer.gov/publications/patient-education/facing-forward  · Eat and drink well. It can be easier to eat if you have 5 or 6 small meals each day, rather than 3 large ones. Keep foods around that are easy to fix, such as canned soups, frozen meals, yogurt, and cottage cheese. Drink plenty of fluids each day-about 8 cups of water or juice. · Plan a work schedule that is right for you. Fatigue may affect the amount of energy you have for your job. You may feel well enough to work your full schedule, or you may need to work less-maybe just a few hours a day or a few days each week. You may want to talk with your boss about ways to work from home so you do not need to commute. If possible, you may want to think about going on medical leave while you have radiation therapy. Skin Changes  What they are:  Radiation therapy can cause skin changes in your treatment area. Here are some common skin changes:  · Redness. Your skin in the treatment area may look as if you have a mild to severe sunburn or tan  · Severe itching. The skin in your treatment area may itch very badly. It is important to avoid scratching, which can cause skin breakdown and infection. Skin breakdown is a problem that happens when the skin in the treatment area peels off faster than it can grow back. · Dry and peeling skin. The skin in your treatment area can get very dry. It may get so dry that it starts to peel, as if you have had a bad sunburn. If it peels off faster than it can grow back, you may develop sores or ulcers. · Moist reaction.  The skin in your treatment area can become wet, sore and infected This problem is more common where you have skin folds, such as your buttocks, behind your ears and under your breasts. It may also occur where your skin is very thin, such as your neck. · Swollen skin. The skin in your treatment area may be swollen and puffy. ·   Ways to Manage Skin Changes    · Skin Care. Take extra good care of your skin during radiation therapy. Be gentle and do not rub, scrub, or scratch in the treatment area. Use creams that your doctor or nurse suggests. Acquaphor / Eucerin / Miaderm   · Apply recommended lotions to treatment area 2 times daily. You should start this when you begin radiation treatments. · Do not put anything on your skin that is very hot or cold. Do not use heating pads, ice packs or other hot or cold items on the treatment area  · Be gentle when you shower or take a bath. You can take a lukewarm shower every day. If you prefer to take a lukewarm bath, so do only every other day and don't soak  For too long. Whether you take a shower or bath, make sure to use a mild soap. Dry yourself with a soft towel by patting, not rubbing, your skin. Be careful not to wash off the ink markings that you need for radiation therapy. · Use only those lotions and skin products that your doctor or nurse suggests. If you are using a prescribed cream for a skin problem or acne, tell your doctor or nurse before you begin radiation treatment. Check with your doctor or nurse before using any of the following skin products:  · Bubble bath     · Cornstarch  · Cream  · Deodorant  · Hair removers  · Makeup  · Oil   · Ointment  · Perfume  · Powder  · Soap   · Sunscreen  · Cool, humid places. Your skin may feel much better when you are in a cool, humid places. You can make rooms more humid by putting a bowl of water on the radiator or using a humidifier.  If you use a humidifier, be sure to follow the directions about cleaning it to prevent bacteria. · Soft fabrics. Wear clothes and use bed sheets that are made of very soft fabrics. · Do not wear clothes in your treatment area that are tight and do not breathe, such as girdles, body shapers, and pantyhose  · Protect your skin from the sun every day. The sun can burn you even on cloudy days or when you are outside for just a few minutes. Do not go to the beach or sunbathe. Wear a broad-brimmed hat, long-sleeved shirt, and long pants when you are outside. Talk with your doctor or nurse about sunscreen lotions. He or she may suggest that you use a sunscreen with an SPF of 30 or higher. You will need to protect your skin form the sun even after radiation therapy is over. · Do not use tanning beds. Tanning beds expose you to the same harmful effects as the sun. · Adhesive tape. Do not put adhesive bandages or other types of sticky tape on your skin in the treatment area. Talk with your doctor or nurse about ways to bandage without tape. · Shaving. Ask your doctor or nurse if you can shave the treatment area. If you can shave, use an electric razor, but do not use a pre-shave liquid. · Talk with your doctor or nurse. Some skin changes can be very serious. Your treatment team will check for skin changes each time you have radiation therapy. Make sure to report any skin changes that you notice. · Medicine. Medicines can help with some skin changes. These include lotions for dry or itchy skin, antibiotics to treat infection, and drug to reduce swelling or itching.     [employer]

## 2020-03-09 ENCOUNTER — APPOINTMENT (OUTPATIENT)
Dept: RADIATION ONCOLOGY | Facility: MEDICAL CENTER | Age: 65
End: 2020-03-09
Payer: COMMERCIAL

## 2020-03-10 PROCEDURE — 77336 RADIATION PHYSICS CONSULT: CPT | Performed by: RADIOLOGY

## 2020-03-10 PROCEDURE — 77334 RADIATION TREATMENT AID(S): CPT

## 2020-03-10 PROCEDURE — 77300 RADIATION THERAPY DOSE PLAN: CPT

## 2020-03-10 PROCEDURE — 77295 3-D RADIOTHERAPY PLAN: CPT

## 2020-03-12 ENCOUNTER — HOSPITAL ENCOUNTER (OUTPATIENT)
Dept: RADIATION ONCOLOGY | Facility: MEDICAL CENTER | Age: 65
Discharge: HOME | End: 2020-03-12
Payer: COMMERCIAL

## 2020-03-12 ENCOUNTER — HOSPITAL ENCOUNTER (OUTPATIENT)
Dept: RADIATION ONCOLOGY | Facility: MEDICAL CENTER | Age: 65
End: 2020-03-12
Payer: COMMERCIAL

## 2020-03-12 PROCEDURE — 77280 THER RAD SIMULAJ FIELD SMPL: CPT

## 2020-03-12 PROCEDURE — 77412 RADIATION TX DELIVERY LVL 3: CPT

## 2020-03-12 PROCEDURE — 77387 GUIDANCE FOR RADJ TX DLVR: CPT

## 2020-03-13 ENCOUNTER — HOSPITAL ENCOUNTER (OUTPATIENT)
Dept: RADIATION ONCOLOGY | Facility: MEDICAL CENTER | Age: 65
Discharge: HOME | End: 2020-03-13
Payer: COMMERCIAL

## 2020-03-13 PROCEDURE — 77412 RADIATION TX DELIVERY LVL 3: CPT

## 2020-03-13 PROCEDURE — 77387 GUIDANCE FOR RADJ TX DLVR: CPT

## 2020-03-16 ENCOUNTER — HOSPITAL ENCOUNTER (OUTPATIENT)
Dept: RADIATION ONCOLOGY | Facility: MEDICAL CENTER | Age: 65
Discharge: HOME | End: 2020-03-16
Attending: RADIOLOGY
Payer: COMMERCIAL

## 2020-03-16 ENCOUNTER — HOSPITAL ENCOUNTER (OUTPATIENT)
Dept: RADIATION ONCOLOGY | Facility: MEDICAL CENTER | Age: 65
Discharge: HOME | End: 2020-03-16
Payer: COMMERCIAL

## 2020-03-16 VITALS
RESPIRATION RATE: 16 BRPM | HEART RATE: 60 BPM | BODY MASS INDEX: 28.69 KG/M2 | TEMPERATURE: 99 F | WEIGHT: 194.25 LBS | OXYGEN SATURATION: 96 % | SYSTOLIC BLOOD PRESSURE: 120 MMHG | DIASTOLIC BLOOD PRESSURE: 70 MMHG

## 2020-03-16 PROCEDURE — 77387 GUIDANCE FOR RADJ TX DLVR: CPT | Performed by: RADIOLOGY

## 2020-03-16 PROCEDURE — 77412 RADIATION TX DELIVERY LVL 3: CPT | Performed by: RADIOLOGY

## 2020-03-17 ENCOUNTER — HOSPITAL ENCOUNTER (OUTPATIENT)
Dept: RADIATION ONCOLOGY | Facility: MEDICAL CENTER | Age: 65
Discharge: HOME | End: 2020-03-17
Attending: RADIOLOGY
Payer: COMMERCIAL

## 2020-03-17 PROCEDURE — 77387 GUIDANCE FOR RADJ TX DLVR: CPT | Performed by: RADIOLOGY

## 2020-03-17 PROCEDURE — 77412 RADIATION TX DELIVERY LVL 3: CPT | Performed by: RADIOLOGY

## 2020-03-18 ENCOUNTER — HOSPITAL ENCOUNTER (OUTPATIENT)
Dept: RADIATION ONCOLOGY | Facility: MEDICAL CENTER | Age: 65
Discharge: HOME | End: 2020-03-18
Attending: RADIOLOGY
Payer: COMMERCIAL

## 2020-03-18 PROCEDURE — 77387 GUIDANCE FOR RADJ TX DLVR: CPT | Performed by: RADIOLOGY

## 2020-03-18 PROCEDURE — 77412 RADIATION TX DELIVERY LVL 3: CPT | Performed by: RADIOLOGY

## 2020-03-18 PROCEDURE — 77336 RADIATION PHYSICS CONSULT: CPT | Performed by: RADIOLOGY

## 2020-03-19 ENCOUNTER — HOSPITAL ENCOUNTER (OUTPATIENT)
Dept: RADIATION ONCOLOGY | Facility: MEDICAL CENTER | Age: 65
Discharge: HOME | End: 2020-03-19
Attending: RADIOLOGY
Payer: COMMERCIAL

## 2020-03-19 PROCEDURE — 77412 RADIATION TX DELIVERY LVL 3: CPT | Performed by: RADIOLOGY

## 2020-03-19 PROCEDURE — 77417 THER RADIOLOGY PORT IMAGE(S): CPT | Performed by: RADIOLOGY

## 2020-03-20 ENCOUNTER — HOSPITAL ENCOUNTER (OUTPATIENT)
Dept: RADIATION ONCOLOGY | Facility: MEDICAL CENTER | Age: 65
Discharge: HOME | End: 2020-03-20
Attending: RADIOLOGY
Payer: COMMERCIAL

## 2020-03-20 PROCEDURE — 77387 GUIDANCE FOR RADJ TX DLVR: CPT | Performed by: RADIOLOGY

## 2020-03-20 PROCEDURE — 77412 RADIATION TX DELIVERY LVL 3: CPT | Performed by: RADIOLOGY

## 2020-03-23 ENCOUNTER — HOSPITAL ENCOUNTER (OUTPATIENT)
Dept: RADIATION ONCOLOGY | Facility: MEDICAL CENTER | Age: 65
Discharge: HOME | End: 2020-03-23
Payer: COMMERCIAL

## 2020-03-23 ENCOUNTER — HOSPITAL ENCOUNTER (OUTPATIENT)
Dept: RADIATION ONCOLOGY | Facility: MEDICAL CENTER | Age: 65
Discharge: HOME | End: 2020-03-23
Attending: RADIOLOGY
Payer: COMMERCIAL

## 2020-03-23 VITALS
SYSTOLIC BLOOD PRESSURE: 117 MMHG | DIASTOLIC BLOOD PRESSURE: 73 MMHG | OXYGEN SATURATION: 97 % | HEART RATE: 73 BPM | RESPIRATION RATE: 16 BRPM | WEIGHT: 194.13 LBS | BODY MASS INDEX: 28.67 KG/M2 | TEMPERATURE: 98.1 F

## 2020-03-23 PROCEDURE — 77387 GUIDANCE FOR RADJ TX DLVR: CPT | Performed by: RADIOLOGY

## 2020-03-23 PROCEDURE — 77412 RADIATION TX DELIVERY LVL 3: CPT | Performed by: RADIOLOGY

## 2020-03-23 NOTE — PROGRESS NOTES
Gordy Turner  3/23/2020  Wt Readings from Last 3 Encounters:   03/23/20 194 lb 2 oz (88.1 kg)   03/16/20 194 lb 4 oz (88.1 kg)   02/27/20 193 lb 8 oz (87.8 kg)     Body mass index is 28.67 kg/m². Pt here for OTV. Patient states right eye is seeing spots as of yesterday, worse with looking at white things. Dr. Jessee Beckford to [facility].    Treatment Area:breast    Patient was seen today for weekly visit. Comfort Alteration    Fatigue: None    Nutritional Alteration  Anorexia: No   Nausea: No   Vomiting: No     Mucous Membrane Alteration  Drainage: No  Lymphedema: No    Skin Alteration   Sensation:none    Radiation Dermatitis:  Intact [x]     Erythema  []     Discoloration  []     Rash []     Dry desquamation  []     Moist desquamation []       Emotional  Coping: effective      Injury, potential bleeding or infection: none    Lab Results   Component Value Date    WBC 8.5 01/13/2020     01/13/2020         /73   Pulse 73   Temp 98.1 °F (36.7 °C) (Oral)   Resp 16   Wt 194 lb 2 oz (88.1 kg)   SpO2 97%   BMI 28.67 kg/m²   Patient Currently in Pain: No                 Assessment/Plan: Patient was seen today for weekly visit.       Ronne Bernheim

## 2020-03-24 ENCOUNTER — TELEPHONE (OUTPATIENT)
Dept: ONCOLOGY | Age: 65
End: 2020-03-24

## 2020-03-24 ENCOUNTER — HOSPITAL ENCOUNTER (OUTPATIENT)
Dept: RADIATION ONCOLOGY | Facility: MEDICAL CENTER | Age: 65
Discharge: HOME | End: 2020-03-24
Attending: RADIOLOGY
Payer: COMMERCIAL

## 2020-03-24 PROCEDURE — 77412 RADIATION TX DELIVERY LVL 3: CPT | Performed by: RADIOLOGY

## 2020-03-24 PROCEDURE — 77387 GUIDANCE FOR RADJ TX DLVR: CPT | Performed by: RADIOLOGY

## 2020-03-24 NOTE — TELEPHONE ENCOUNTER
Called patient to check in on her as she is going through radiation therapy. I introduced myself and encouraged her to call as needed. Left my name and number. Let her know that I am here if she is having any concerns or problems.

## 2020-03-24 NOTE — TELEPHONE ENCOUNTER
SW reviewed patient's psychosocial form and due to Covid-19 concerns, SW unable to meet with patient in person. SW services introduced and patient expresses  distress regarding medical bills and insurance. Patient has UMR through her employer, [employer], but it does not provide very good coverage. Patient mailed [facility] applications. SW contact provided and patient encouraged to call as needed. Thelma Marshall.  Carlee Tilley

## 2020-03-25 ENCOUNTER — HOSPITAL ENCOUNTER (OUTPATIENT)
Dept: RADIATION ONCOLOGY | Facility: MEDICAL CENTER | Age: 65
Discharge: HOME | End: 2020-03-25
Attending: RADIOLOGY
Payer: COMMERCIAL

## 2020-03-25 PROCEDURE — 77300 RADIATION THERAPY DOSE PLAN: CPT | Performed by: RADIOLOGY

## 2020-03-25 PROCEDURE — 77387 GUIDANCE FOR RADJ TX DLVR: CPT | Performed by: RADIOLOGY

## 2020-03-25 PROCEDURE — 77334 RADIATION TREATMENT AID(S): CPT | Performed by: RADIOLOGY

## 2020-03-25 PROCEDURE — 77412 RADIATION TX DELIVERY LVL 3: CPT | Performed by: RADIOLOGY

## 2020-03-26 ENCOUNTER — HOSPITAL ENCOUNTER (OUTPATIENT)
Dept: RADIATION ONCOLOGY | Facility: MEDICAL CENTER | Age: 65
Discharge: HOME | End: 2020-03-26
Attending: RADIOLOGY
Payer: COMMERCIAL

## 2020-03-26 PROCEDURE — 77412 RADIATION TX DELIVERY LVL 3: CPT | Performed by: RADIOLOGY

## 2020-03-26 PROCEDURE — 77417 THER RADIOLOGY PORT IMAGE(S): CPT | Performed by: RADIOLOGY

## 2020-03-27 ENCOUNTER — HOSPITAL ENCOUNTER (OUTPATIENT)
Dept: RADIATION ONCOLOGY | Facility: MEDICAL CENTER | Age: 65
Discharge: HOME | End: 2020-03-27
Attending: RADIOLOGY
Payer: COMMERCIAL

## 2020-03-27 PROCEDURE — 77387 GUIDANCE FOR RADJ TX DLVR: CPT | Performed by: RADIOLOGY

## 2020-03-27 PROCEDURE — 77412 RADIATION TX DELIVERY LVL 3: CPT | Performed by: RADIOLOGY

## 2020-03-30 ENCOUNTER — TELEPHONE (OUTPATIENT)
Dept: RADIATION ONCOLOGY | Facility: MEDICAL CENTER | Age: 65
End: 2020-03-30

## 2020-03-30 ENCOUNTER — HOSPITAL ENCOUNTER (OUTPATIENT)
Dept: RADIATION ONCOLOGY | Facility: MEDICAL CENTER | Age: 65
Discharge: HOME | End: 2020-03-30
Attending: RADIOLOGY
Payer: COMMERCIAL

## 2020-03-30 PROCEDURE — 77387 GUIDANCE FOR RADJ TX DLVR: CPT | Performed by: RADIOLOGY

## 2020-03-30 PROCEDURE — 77412 RADIATION TX DELIVERY LVL 3: CPT | Performed by: RADIOLOGY

## 2020-03-31 ENCOUNTER — HOSPITAL ENCOUNTER (OUTPATIENT)
Dept: RADIATION ONCOLOGY | Facility: MEDICAL CENTER | Age: 65
Discharge: HOME | End: 2020-03-31
Payer: COMMERCIAL

## 2020-03-31 ENCOUNTER — HOSPITAL ENCOUNTER (OUTPATIENT)
Dept: RADIATION ONCOLOGY | Facility: MEDICAL CENTER | Age: 65
Discharge: HOME | End: 2020-03-31
Attending: RADIOLOGY
Payer: COMMERCIAL

## 2020-03-31 VITALS
DIASTOLIC BLOOD PRESSURE: 59 MMHG | SYSTOLIC BLOOD PRESSURE: 119 MMHG | RESPIRATION RATE: 18 BRPM | BODY MASS INDEX: 28.56 KG/M2 | WEIGHT: 193.38 LBS | HEART RATE: 61 BPM | OXYGEN SATURATION: 95 % | TEMPERATURE: 97.2 F

## 2020-03-31 PROCEDURE — 77412 RADIATION TX DELIVERY LVL 3: CPT | Performed by: RADIOLOGY

## 2020-03-31 PROCEDURE — 77336 RADIATION PHYSICS CONSULT: CPT | Performed by: RADIOLOGY

## 2020-03-31 PROCEDURE — 77387 GUIDANCE FOR RADJ TX DLVR: CPT | Performed by: RADIOLOGY

## 2020-04-01 ENCOUNTER — HOSPITAL ENCOUNTER (OUTPATIENT)
Dept: RADIATION ONCOLOGY | Facility: MEDICAL CENTER | Age: 65
Discharge: HOME | End: 2020-04-01
Attending: RADIOLOGY
Payer: COMMERCIAL

## 2020-04-01 PROCEDURE — 77412 RADIATION TX DELIVERY LVL 3: CPT | Performed by: RADIOLOGY

## 2020-04-01 PROCEDURE — 77336 RADIATION PHYSICS CONSULT: CPT | Performed by: RADIOLOGY

## 2020-04-01 PROCEDURE — 77387 GUIDANCE FOR RADJ TX DLVR: CPT | Performed by: RADIOLOGY

## 2020-04-02 ENCOUNTER — HOSPITAL ENCOUNTER (OUTPATIENT)
Dept: RADIATION ONCOLOGY | Facility: MEDICAL CENTER | Age: 65
Discharge: HOME | End: 2020-04-02
Attending: RADIOLOGY
Payer: COMMERCIAL

## 2020-04-02 PROCEDURE — 77387 GUIDANCE FOR RADJ TX DLVR: CPT | Performed by: RADIOLOGY

## 2020-04-02 PROCEDURE — 77412 RADIATION TX DELIVERY LVL 3: CPT | Performed by: RADIOLOGY

## 2020-04-03 ENCOUNTER — HOSPITAL ENCOUNTER (OUTPATIENT)
Dept: RADIATION ONCOLOGY | Facility: MEDICAL CENTER | Age: 65
Discharge: HOME | End: 2020-04-03
Attending: RADIOLOGY
Payer: COMMERCIAL

## 2020-04-03 ENCOUNTER — APPOINTMENT (OUTPATIENT)
Dept: RADIATION ONCOLOGY | Facility: MEDICAL CENTER | Age: 65
End: 2020-04-03
Attending: RADIOLOGY
Payer: COMMERCIAL

## 2020-04-03 PROCEDURE — 77387 GUIDANCE FOR RADJ TX DLVR: CPT | Performed by: RADIOLOGY

## 2020-04-03 PROCEDURE — 77412 RADIATION TX DELIVERY LVL 3: CPT | Performed by: RADIOLOGY

## 2020-04-03 PROCEDURE — 77280 THER RAD SIMULAJ FIELD SMPL: CPT | Performed by: RADIOLOGY

## 2020-04-06 ENCOUNTER — HOSPITAL ENCOUNTER (OUTPATIENT)
Dept: RADIATION ONCOLOGY | Facility: MEDICAL CENTER | Age: 65
Discharge: HOME | End: 2020-04-06
Payer: COMMERCIAL

## 2020-04-06 ENCOUNTER — HOSPITAL ENCOUNTER (OUTPATIENT)
Dept: RADIATION ONCOLOGY | Facility: MEDICAL CENTER | Age: 65
Discharge: HOME | End: 2020-04-06
Attending: RADIOLOGY
Payer: COMMERCIAL

## 2020-04-06 VITALS
DIASTOLIC BLOOD PRESSURE: 77 MMHG | RESPIRATION RATE: 16 BRPM | OXYGEN SATURATION: 97 % | BODY MASS INDEX: 28.8 KG/M2 | HEART RATE: 57 BPM | WEIGHT: 195 LBS | TEMPERATURE: 97.2 F | SYSTOLIC BLOOD PRESSURE: 113 MMHG

## 2020-04-06 PROCEDURE — 77387 GUIDANCE FOR RADJ TX DLVR: CPT | Performed by: RADIOLOGY

## 2020-04-06 PROCEDURE — 77412 RADIATION TX DELIVERY LVL 3: CPT | Performed by: RADIOLOGY

## 2020-04-07 ENCOUNTER — HOSPITAL ENCOUNTER (OUTPATIENT)
Dept: RADIATION ONCOLOGY | Facility: MEDICAL CENTER | Age: 65
Discharge: HOME | End: 2020-04-07
Attending: RADIOLOGY
Payer: COMMERCIAL

## 2020-04-07 ENCOUNTER — TELEPHONE (OUTPATIENT)
Dept: ONCOLOGY | Age: 65
End: 2020-04-07

## 2020-04-07 PROCEDURE — 77412 RADIATION TX DELIVERY LVL 3: CPT | Performed by: RADIOLOGY

## 2020-04-07 PROCEDURE — 77387 GUIDANCE FOR RADJ TX DLVR: CPT | Performed by: RADIOLOGY

## 2020-04-07 NOTE — TELEPHONE ENCOUNTER
Navigation and completion of radiation therapy letter sent to patient along with onc rehab information. Pt on pending.

## 2020-04-08 ENCOUNTER — HOSPITAL ENCOUNTER (OUTPATIENT)
Dept: RADIATION ONCOLOGY | Facility: MEDICAL CENTER | Age: 65
Discharge: HOME | End: 2020-04-08
Attending: RADIOLOGY
Payer: COMMERCIAL

## 2020-04-08 PROCEDURE — 77412 RADIATION TX DELIVERY LVL 3: CPT | Performed by: RADIOLOGY

## 2020-04-08 PROCEDURE — 77387 GUIDANCE FOR RADJ TX DLVR: CPT | Performed by: RADIOLOGY

## 2020-04-24 ENCOUNTER — TELEPHONE (OUTPATIENT)
Dept: ONCOLOGY | Age: 65
End: 2020-04-24

## 2020-05-05 ENCOUNTER — TELEPHONE (OUTPATIENT)
Dept: RADIATION ONCOLOGY | Facility: MEDICAL CENTER | Age: 65
End: 2020-05-05

## 2020-05-11 ENCOUNTER — TELEPHONE (OUTPATIENT)
Dept: RADIATION ONCOLOGY | Facility: MEDICAL CENTER | Age: 65
End: 2020-05-11

## 2020-05-29 ENCOUNTER — TELEPHONE (OUTPATIENT)
Dept: ONCOLOGY | Age: 65
End: 2020-05-29

## 2020-06-01 ENCOUNTER — TELEPHONE (OUTPATIENT)
Dept: ONCOLOGY | Age: 65
End: 2020-06-01

## 2020-06-12 ENCOUNTER — TELEPHONE (OUTPATIENT)
Dept: ONCOLOGY | Age: 65
End: 2020-06-12

## 2020-06-17 ENCOUNTER — TELEPHONE (OUTPATIENT)
Dept: INFUSION THERAPY | Facility: MEDICAL CENTER | Age: 65
End: 2020-06-17

## 2020-07-16 ENCOUNTER — TELEPHONE (OUTPATIENT)
Dept: RADIATION ONCOLOGY | Facility: MEDICAL CENTER | Age: 65
End: 2020-07-16

## 2020-07-16 NOTE — TELEPHONE ENCOUNTER
I tried to call Catalina Middleton to reschedule her follow up from today. No answer, had to leave a message.

## 2020-07-21 ENCOUNTER — TELEPHONE (OUTPATIENT)
Dept: RADIATION ONCOLOGY | Facility: MEDICAL CENTER | Age: 65
End: 2020-07-21

## 2020-07-21 NOTE — TELEPHONE ENCOUNTER
I tried to call [facility] to reschedule her follow up we canceled on 7/16/2020. No answer, had to leave a message.

## 2021-09-07 ENCOUNTER — HOSPITAL ENCOUNTER (OUTPATIENT)
Dept: MAMMOGRAPHY | Age: 66
Discharge: HOME | End: 2021-09-09
Payer: COMMERCIAL

## 2021-09-07 ENCOUNTER — HOSPITAL ENCOUNTER (OUTPATIENT)
Age: 66
Discharge: HOME | End: 2021-09-07
Payer: COMMERCIAL

## 2021-09-07 DIAGNOSIS — Z12.31 BREAST CANCER SCREENING BY MAMMOGRAM: ICD-10-CM

## 2021-09-07 DIAGNOSIS — Z91.89 AT RISK FOR OSTEOPOROSIS: ICD-10-CM

## 2021-09-07 LAB
ABSOLUTE EOS #: 0.15 K/UL (ref 0–0.44)
ABSOLUTE IMMATURE GRANULOCYTE: 0.03 K/UL (ref 0–0.3)
ABSOLUTE LYMPH #: 1.61 K/UL (ref 1.1–3.7)
ABSOLUTE MONO #: 0.89 K/UL (ref 0.1–1.2)
ALBUMIN SERPL-MCNC: 4.3 G/DL (ref 3.5–5.2)
ALBUMIN/GLOBULIN RATIO: 1.2 (ref 1–2.5)
ALP BLD-CCNC: 78 U/L (ref 35–104)
ALT SERPL-CCNC: 13 U/L (ref 5–33)
ANION GAP SERPL CALCULATED.3IONS-SCNC: 10 MMOL/L (ref 9–17)
AST SERPL-CCNC: 16 U/L
BASOPHILS # BLD: 1 % (ref 0–2)
BASOPHILS ABSOLUTE: 0.07 K/UL (ref 0–0.2)
BILIRUB SERPL-MCNC: 0.27 MG/DL (ref 0.3–1.2)
BUN BLDV-MCNC: 16 MG/DL (ref 8–23)
BUN/CREAT BLD: ABNORMAL (ref 9–20)
CALCIUM SERPL-MCNC: 9.1 MG/DL (ref 8.6–10.4)
CHLORIDE BLD-SCNC: 105 MMOL/L (ref 98–107)
CHOLESTEROL/HDL RATIO: 2.4
CHOLESTEROL: 154 MG/DL
CO2: 25 MMOL/L (ref 20–31)
CREAT SERPL-MCNC: 0.98 MG/DL (ref 0.5–0.9)
DIFFERENTIAL TYPE: ABNORMAL
EOSINOPHILS RELATIVE PERCENT: 2 % (ref 1–4)
GFR AFRICAN AMERICAN: >60 ML/MIN
GFR NON-AFRICAN AMERICAN: 57 ML/MIN
GFR SERPL CREATININE-BSD FRML MDRD: ABNORMAL ML/MIN/{1.73_M2}
GFR SERPL CREATININE-BSD FRML MDRD: ABNORMAL ML/MIN/{1.73_M2}
GLUCOSE BLD-MCNC: 102 MG/DL (ref 70–99)
HCT VFR BLD CALC: 39.8 % (ref 36.3–47.1)
HDLC SERPL-MCNC: 65 MG/DL
HEMOGLOBIN: 12.6 G/DL (ref 11.9–15.1)
IMMATURE GRANULOCYTES: 0 %
LDL CHOLESTEROL: 63 MG/DL (ref 0–130)
LYMPHOCYTES # BLD: 19 % (ref 24–43)
MCH RBC QN AUTO: 30.1 PG (ref 25.2–33.5)
MCHC RBC AUTO-ENTMCNC: 31.7 G/DL (ref 28.4–34.8)
MCV RBC AUTO: 95.2 FL (ref 82.6–102.9)
MONOCYTES # BLD: 11 % (ref 3–12)
NRBC AUTOMATED: 0 PER 100 WBC
PDW BLD-RTO: 12.9 % (ref 11.8–14.4)
PLATELET # BLD: 242 K/UL (ref 138–453)
PLATELET ESTIMATE: ABNORMAL
PMV BLD AUTO: 10.9 FL (ref 8.1–13.5)
POTASSIUM SERPL-SCNC: 4.2 MMOL/L (ref 3.7–5.3)
RBC # BLD: 4.18 M/UL (ref 3.95–5.11)
RBC # BLD: ABNORMAL 10*6/UL
SEG NEUTROPHILS: 67 % (ref 36–65)
SEGMENTED NEUTROPHILS ABSOLUTE COUNT: 5.64 K/UL (ref 1.5–8.1)
SODIUM BLD-SCNC: 140 MMOL/L (ref 135–144)
TOTAL PROTEIN: 7.9 G/DL (ref 6.4–8.3)
TRIGL SERPL-MCNC: 128 MG/DL
TSH SERPL DL<=0.05 MIU/L-ACNC: 2.81 MIU/L (ref 0.3–5)
VLDLC SERPL CALC-MCNC: NORMAL MG/DL (ref 1–30)
WBC # BLD: 8.4 K/UL (ref 3.5–11.3)
WBC # BLD: ABNORMAL 10*3/UL

## 2021-09-07 PROCEDURE — 36415 COLL VENOUS BLD VENIPUNCTURE: CPT

## 2021-09-07 PROCEDURE — 77063 BREAST TOMOSYNTHESIS BI: CPT

## 2021-09-07 PROCEDURE — 80053 COMPREHEN METABOLIC PANEL: CPT

## 2021-09-07 PROCEDURE — 77080 DXA BONE DENSITY AXIAL: CPT

## 2021-09-07 PROCEDURE — 84443 ASSAY THYROID STIM HORMONE: CPT

## 2021-09-07 PROCEDURE — 85025 COMPLETE CBC W/AUTO DIFF WBC: CPT

## 2021-09-07 PROCEDURE — 80061 LIPID PANEL: CPT

## 2022-07-18 ENCOUNTER — HOSPITAL ENCOUNTER (OUTPATIENT)
Age: 67
Discharge: HOME | End: 2022-07-18
Payer: COMMERCIAL

## 2022-07-18 LAB — VITAMIN D 25-HYDROXY: 33.8 NG/ML

## 2022-07-18 PROCEDURE — 82306 VITAMIN D 25 HYDROXY: CPT

## 2022-07-18 PROCEDURE — 36415 COLL VENOUS BLD VENIPUNCTURE: CPT

## 2022-11-14 ENCOUNTER — TRANSCRIBE ORDERS (OUTPATIENT)
Dept: ADMINISTRATIVE | Age: 67
End: 2022-11-14

## 2022-11-14 DIAGNOSIS — C50.212 MALIGNANT NEOPLASM OF UPPER-INNER QUADRANT OF LEFT FEMALE BREAST, UNSPECIFIED ESTROGEN RECEPTOR STATUS (HCC): Primary | ICD-10-CM

## 2023-07-06 ENCOUNTER — HOSPITAL ENCOUNTER (OUTPATIENT)
Dept: MRI IMAGING | Age: 68
Discharge: HOME | End: 2023-07-08
Payer: COMMERCIAL

## 2023-07-06 DIAGNOSIS — C50.212 MALIGNANT NEOPLASM OF UPPER-INNER QUADRANT OF LEFT FEMALE BREAST, UNSPECIFIED ESTROGEN RECEPTOR STATUS (HCC): ICD-10-CM

## 2023-07-06 LAB
CREAT SERPL-MCNC: 0.85 MG/DL (ref 0.5–0.9)
GFR SERPL CREATININE-BSD FRML MDRD: >60 ML/MIN/1.73M2

## 2023-07-06 PROCEDURE — 82565 ASSAY OF CREATININE: CPT

## 2023-07-06 PROCEDURE — A9579 GAD-BASE MR CONTRAST NOS,1ML: HCPCS | Performed by: NURSE PRACTITIONER

## 2023-07-06 PROCEDURE — C8908 MRI W/O FOL W/CONT, BREAST,: HCPCS

## 2023-07-06 PROCEDURE — 2580000003 HC RX 258: Performed by: NURSE PRACTITIONER

## 2023-07-06 PROCEDURE — 6360000004 HC RX CONTRAST MEDICATION: Performed by: NURSE PRACTITIONER

## 2023-07-06 RX ORDER — SODIUM CHLORIDE 0.9 % (FLUSH) 0.9 %
10 SYRINGE (ML) INJECTION ONCE
Status: COMPLETED | OUTPATIENT
Start: 2023-07-06 | End: 2023-07-06

## 2023-07-06 RX ORDER — 0.9 % SODIUM CHLORIDE 0.9 %
50 INTRAVENOUS SOLUTION INTRAVENOUS ONCE
Status: COMPLETED | OUTPATIENT
Start: 2023-07-06 | End: 2023-07-06

## 2023-07-06 RX ADMIN — GADOTERIDOL 16 ML: 279.3 INJECTION, SOLUTION INTRAVENOUS at 11:08

## 2023-07-06 RX ADMIN — SODIUM CHLORIDE 50 ML: 9 INJECTION, SOLUTION INTRAVENOUS at 11:09

## 2023-07-06 RX ADMIN — SODIUM CHLORIDE, PRESERVATIVE FREE 10 ML: 5 INJECTION INTRAVENOUS at 11:09

## 2023-07-28 ENCOUNTER — HOSPITAL ENCOUNTER (OUTPATIENT)
Age: 68
Setting detail: SPECIMEN
Discharge: HOME | End: 2023-07-28

## 2023-07-28 LAB
ANION GAP SERPL CALCULATED.3IONS-SCNC: 18 MMOL/L (ref 9–17)
BUN SERPL-MCNC: 20 MG/DL (ref 8–23)
CALCIUM SERPL-MCNC: 9.7 MG/DL (ref 8.6–10.4)
CHLORIDE SERPL-SCNC: 105 MMOL/L (ref 98–107)
CHOLEST SERPL-MCNC: 161 MG/DL
CHOLESTEROL/HDL RATIO: 2.7
CO2 SERPL-SCNC: 22 MMOL/L (ref 20–31)
CREAT SERPL-MCNC: 0.9 MG/DL (ref 0.5–0.9)
ERYTHROCYTE [DISTWIDTH] IN BLOOD BY AUTOMATED COUNT: 14.2 % (ref 11.8–14.4)
GFR SERPL CREATININE-BSD FRML MDRD: >60 ML/MIN/1.73M2
GLUCOSE SERPL-MCNC: 61 MG/DL (ref 70–99)
HCT VFR BLD AUTO: 38.9 % (ref 36.3–47.1)
HDLC SERPL-MCNC: 59 MG/DL
HGB BLD-MCNC: 12.7 G/DL (ref 11.9–15.1)
LDLC SERPL CALC-MCNC: 74 MG/DL (ref 0–130)
MCH RBC QN AUTO: 31.4 PG (ref 25.2–33.5)
MCHC RBC AUTO-ENTMCNC: 32.6 G/DL (ref 28.4–34.8)
MCV RBC AUTO: 96 FL (ref 82.6–102.9)
NRBC BLD-RTO: 0 PER 100 WBC
PLATELET # BLD AUTO: 249 K/UL (ref 138–453)
PMV BLD AUTO: 10.4 FL (ref 8.1–13.5)
POTASSIUM SERPL-SCNC: 4 MMOL/L (ref 3.7–5.3)
RBC # BLD AUTO: 4.05 M/UL (ref 3.95–5.11)
SODIUM SERPL-SCNC: 145 MMOL/L (ref 135–144)
TRIGL SERPL-MCNC: 140 MG/DL
WBC OTHER # BLD: 10.9 K/UL (ref 3.5–11.3)

## 2023-07-31 ENCOUNTER — HOSPITAL ENCOUNTER (OUTPATIENT)
Age: 68
Discharge: HOME | End: 2023-08-02
Payer: COMMERCIAL

## 2023-07-31 ENCOUNTER — HOSPITAL ENCOUNTER (OUTPATIENT)
Dept: GENERAL RADIOLOGY | Age: 68
Discharge: HOME | End: 2023-08-02
Payer: COMMERCIAL

## 2023-07-31 DIAGNOSIS — M25.561 ACUTE PAIN OF RIGHT KNEE: ICD-10-CM

## 2023-07-31 PROCEDURE — 73562 X-RAY EXAM OF KNEE 3: CPT

## 2024-11-07 ENCOUNTER — HOSPITAL ENCOUNTER (OUTPATIENT)
Age: 69
Setting detail: SPECIMEN
Discharge: HOME | End: 2024-11-07

## 2024-11-07 LAB
ALBUMIN SERPL-MCNC: 4.6 G/DL (ref 3.5–5.2)
ALBUMIN/GLOB SERPL: 1.4 {RATIO} (ref 1–2.5)
ALP SERPL-CCNC: 87 U/L (ref 35–104)
ALT SERPL-CCNC: 12 U/L (ref 10–35)
ANION GAP SERPL CALCULATED.3IONS-SCNC: 10 MMOL/L (ref 9–16)
AST SERPL-CCNC: 19 U/L (ref 10–35)
BASOPHILS # BLD: 0.07 K/UL (ref 0–0.2)
BASOPHILS NFR BLD: 1 % (ref 0–2)
BILIRUB DIRECT SERPL-MCNC: 0.2 MG/DL (ref 0–0.2)
BILIRUB INDIRECT SERPL-MCNC: 0.1 MG/DL (ref 0–1)
BILIRUB SERPL-MCNC: 0.3 MG/DL (ref 0–1.2)
BUN SERPL-MCNC: 15 MG/DL (ref 8–23)
CALCIUM SERPL-MCNC: 9.7 MG/DL (ref 8.6–10.4)
CHLORIDE SERPL-SCNC: 101 MMOL/L (ref 98–107)
CO2 SERPL-SCNC: 28 MMOL/L (ref 20–31)
CREAT SERPL-MCNC: 0.8 MG/DL (ref 0.6–0.9)
EOSINOPHIL # BLD: 0.08 K/UL (ref 0–0.44)
EOSINOPHILS RELATIVE PERCENT: 1 % (ref 1–4)
ERYTHROCYTE [DISTWIDTH] IN BLOOD BY AUTOMATED COUNT: 12.8 % (ref 11.8–14.4)
GFR, ESTIMATED: 81 ML/MIN/1.73M2
GLUCOSE SERPL-MCNC: 79 MG/DL (ref 74–99)
HCT VFR BLD AUTO: 42.4 % (ref 36.3–47.1)
HGB BLD-MCNC: 13.5 G/DL (ref 11.9–15.1)
IMM GRANULOCYTES # BLD AUTO: 0.03 K/UL (ref 0–0.3)
IMM GRANULOCYTES NFR BLD: 0 %
LYMPHOCYTES NFR BLD: 1.88 K/UL (ref 1.1–3.7)
LYMPHOCYTES RELATIVE PERCENT: 24 % (ref 24–43)
MCH RBC QN AUTO: 31.1 PG (ref 25.2–33.5)
MCHC RBC AUTO-ENTMCNC: 31.8 G/DL (ref 28.4–34.8)
MCV RBC AUTO: 97.7 FL (ref 82.6–102.9)
MONOCYTES NFR BLD: 0.82 K/UL (ref 0.1–1.2)
MONOCYTES NFR BLD: 11 % (ref 3–12)
NEUTROPHILS NFR BLD: 63 % (ref 36–65)
NEUTS SEG NFR BLD: 4.94 K/UL (ref 1.5–8.1)
NRBC BLD-RTO: 0 PER 100 WBC
PLATELET # BLD AUTO: 257 K/UL (ref 138–453)
PMV BLD AUTO: 10.7 FL (ref 8.1–13.5)
POTASSIUM SERPL-SCNC: 4.1 MMOL/L (ref 3.7–5.3)
PROT SERPL-MCNC: 8 G/DL (ref 6.6–8.7)
RBC # BLD AUTO: 4.34 M/UL (ref 3.95–5.11)
SODIUM SERPL-SCNC: 139 MMOL/L (ref 136–145)
TSH SERPL DL<=0.05 MIU/L-ACNC: 2.1 UIU/ML (ref 0.27–4.2)
WBC OTHER # BLD: 7.8 K/UL (ref 3.5–11.3)

## 2024-11-15 LAB — CYTOLOGY REPORT: NORMAL

## 2025-04-09 ENCOUNTER — HOSPITAL ENCOUNTER (OUTPATIENT)
Age: 70
Setting detail: SPECIMEN
Discharge: HOME | End: 2025-04-09

## 2025-05-12 LAB — SURGICAL PATHOLOGY REPORT: NORMAL

## (undated) DEVICE — DRESSING TRNSPAR W5XL4.5IN FLM SHT SEMIPERMEABLE WIND

## (undated) DEVICE — YANKAUER,FLEXIBLE HANDLE,REGLR CAPACITY: Brand: MEDLINE INDUSTRIES, INC.

## (undated) DEVICE — PAD,NON-ADHERENT,3X8,STERILE,LF,1/PK: Brand: MEDLINE

## (undated) DEVICE — DRAPE,REIN 53X77,STERILE: Brand: MEDLINE

## (undated) DEVICE — GRID BX L4.65IN RADLUC FOR SAFE IMAG TRNSPRT PROC BRST SPEC

## (undated) DEVICE — GLOVE SURG SZ 6 CRM LTX FREE POLYISOPRENE POLYMER BEAD ANTI

## (undated) DEVICE — SUTURE VCRL SZ 3-0 L27IN ABSRB UD L26MM SH 1/2 CIR J416H

## (undated) DEVICE — SURGICAL PROCEDURE KIT BASIN MAJ SET UP

## (undated) DEVICE — CONTAINER,SPECIMEN,OR STERILE,4OZ: Brand: MEDLINE

## (undated) DEVICE — TOWEL,OR,DSP,ST,BLUE,DLX,XR,4/PK,20PK/CS: Brand: MEDLINE

## (undated) DEVICE — STRIP,CLOSURE,WOUND,MEDI-STRIP,1/2X4: Brand: MEDLINE

## (undated) DEVICE — PAD,ABDOMINAL,5"X9",ST,LF,25/BX: Brand: MEDLINE INDUSTRIES, INC.

## (undated) DEVICE — GARMENT,MEDLINE,DVT,INT,CALF,MED, GEN2: Brand: MEDLINE

## (undated) DEVICE — SKIN PREP TRAY W/CHG: Brand: MEDLINE INDUSTRIES, INC.

## (undated) DEVICE — NEEDLE HYPO 25GA L1.5IN BLU POLYPR HUB S STL REG BVL STR

## (undated) DEVICE — GLOVE SURG SZ 8 CRM LTX FREE POLYISOPRENE POLYMER BEAD ANTI

## (undated) DEVICE — APPLIER CLP L L13IN TI MULT RNG HNDL 20 CLP STR LIGACLP

## (undated) DEVICE — DRAPE IRRIG FLD WRM W44XL44IN W/ AORN STD PRTBL INTRATEMP

## (undated) DEVICE — PROBE COVER KIT: Brand: MEDLINE INDUSTRIES, INC.

## (undated) DEVICE — BANDAGE COMPR W6INXL5YD WHT BGE POLY COT M E WRP WV HK AND

## (undated) DEVICE — APPLIER LIG CLP M L11IN TI STR RNG HNDL FOR 20 CLP DISP

## (undated) DEVICE — PREP-RESISTANT MARKER W/ RULER: Brand: MEDLINE INDUSTRIES, INC.

## (undated) DEVICE — PLASMABLADE PS210-030S 3.0S LOCK: Brand: PLASMABLADE™

## (undated) DEVICE — GOWN,SIRUS,NON REINFRCD,LARGE,SET IN SL: Brand: MEDLINE

## (undated) DEVICE — GLOVE SURG SZ 75 CRM LTX FREE POLYISOPRENE POLYMER BEAD ANTI

## (undated) DEVICE — TUBING, SUCTION, 1/4" X 12', STRAIGHT: Brand: MEDLINE

## (undated) DEVICE — ADHESIVE SKIN CLSR 0.7ML TOP DERMBND ADV